# Patient Record
Sex: FEMALE | Race: WHITE | NOT HISPANIC OR LATINO | Employment: UNEMPLOYED | URBAN - METROPOLITAN AREA
[De-identification: names, ages, dates, MRNs, and addresses within clinical notes are randomized per-mention and may not be internally consistent; named-entity substitution may affect disease eponyms.]

---

## 2019-09-19 LAB
ABO GROUP BLD: NORMAL
BLD GP AB SCN SERPL QL: NEGATIVE
HBV SURFACE AG SERPL QL IA: NEGATIVE
RH BLD: NEGATIVE
RUBV IGG SERPL IA-ACNC: NORMAL

## 2019-12-13 ENCOUNTER — INITIAL PRENATAL (OUTPATIENT)
Dept: OBGYN | Facility: CLINIC | Age: 29
End: 2019-12-13
Payer: COMMERCIAL

## 2019-12-13 VITALS
HEIGHT: 63 IN | BODY MASS INDEX: 27.85 KG/M2 | SYSTOLIC BLOOD PRESSURE: 96 MMHG | DIASTOLIC BLOOD PRESSURE: 60 MMHG | WEIGHT: 157.2 LBS

## 2019-12-13 DIAGNOSIS — Z34.80 SUPERVISION OF OTHER NORMAL PREGNANCY, ANTEPARTUM: Primary | ICD-10-CM

## 2019-12-13 PROCEDURE — 59402 PR NEW OB HIGH RISK: CPT | Performed by: NURSE PRACTITIONER

## 2019-12-13 PROCEDURE — 81002 URINALYSIS NONAUTO W/O SCOPE: CPT | Performed by: NURSE PRACTITIONER

## 2019-12-13 ASSESSMENT — ENCOUNTER SYMPTOMS
PSYCHIATRIC NEGATIVE: 1
CARDIOVASCULAR NEGATIVE: 1
GASTROINTESTINAL NEGATIVE: 1
RESPIRATORY NEGATIVE: 1
NEUROLOGICAL NEGATIVE: 1
CONSTITUTIONAL NEGATIVE: 1
MUSCULOSKELETAL NEGATIVE: 1
EYES NEGATIVE: 1

## 2019-12-13 NOTE — PROGRESS NOTES
S:  Zoe Whitmore is a 29 y.o. No obstetric history on file. who presents for her new OB exam.  She is 20w4d with and BEL of Estimated Date of Delivery: 2020. based off of LMP which was consistent with early US. She has no complaints.  She is currently not working. Discussed heavy lifting and chemical exposure. No ER visits. She has had previous care in this pregnancy in [place]. Records brought with patient. PNL done, declines AFP, and complete anatomy US not done yet.    Declines AFP.  Declines CF.  Denies VB, LOF, or cramping.  Denies dysuria, vaginal DC. Reports good fetal movement.     Pt is  and lives with  and child.  Pregnancy is planned and desired.  She has a history of 1 full term  without any complications. She also had an early 9 week SAB which she states passed on its own without any complications.      Discussed diet and exercise during pregnancy. Encouraged good nutrition, and daily exercise including walking or swimming. Discussed expected weight gain during pregnancy. Discussed adequate hydration during pregnancy.    No past medical history on file.  No family history on file.  Social History     Socioeconomic History   • Marital status: Single     Spouse name: Not on file   • Number of children: Not on file   • Years of education: Not on file   • Highest education level: Not on file   Occupational History   • Not on file   Social Needs   • Financial resource strain: Not on file   • Food insecurity:     Worry: Not on file     Inability: Not on file   • Transportation needs:     Medical: Not on file     Non-medical: Not on file   Tobacco Use   • Smoking status: Not on file   Substance and Sexual Activity   • Alcohol use: Not on file   • Drug use: Not on file   • Sexual activity: Not on file   Lifestyle   • Physical activity:     Days per week: Not on file     Minutes per session: Not on file   • Stress: Not on file   Relationships   • Social connections:     Talks on phone:  "Not on file     Gets together: Not on file     Attends [employer] service: Not on file     Active member of club or organization: Not on file     Attends meetings of clubs or organizations: Not on file     Relationship status: Not on file   • Intimate partner violence:     Fear of current or ex partner: Not on file     Emotionally abused: Not on file     Physically abused: Not on file     Forced sexual activity: Not on file   Other Topics Concern   • Not on file   Social History Narrative   • Not on file     OB History    Para Term  AB Living   1             SAB TAB Ectopic Molar Multiple Live Births                    # Outcome Date GA Lbr Ambrocio/2nd Weight Sex Delivery Anes PTL Lv   1 Current                History of Varicella Virus: yes  History of HSV I or II in self or partner: no  History of Thyroid problems: no    O:  BP (!) 96/60   Ht 1.6 m (5' 3\")   Wt 71.3 kg (157 lb 3.2 oz)    See Prenatal Physical.    Wet mount: deferred, no s/sx  Chaperone offered: n/a    A:   1.  IUP @ 20w4d per LMP        2.  S=D        3.  See problem list below           There are no active problems to display for this patient.        P:  1.  GC/CT & pap done at previous clinic- records requested for Pap        2.  Prenatal labs already done, see media        3.  Discussed PNV, diet, avoidances and adequate water intake        4.  NOB packet given        5.  Return to office in 4 wks        6.  Complete OB US at next available            No orders of the defined types were placed in this encounter.      HPI    Review of Systems   Constitutional: Negative.    HENT: Negative.    Eyes: Negative.    Respiratory: Negative.    Cardiovascular: Negative.    Gastrointestinal: Negative.    Genitourinary: Negative.    Musculoskeletal: Negative.    Skin: Negative.    Neurological: Negative.    Endo/Heme/Allergies: Negative.    Psychiatric/Behavioral: Negative.    All other systems reviewed and are negative.         Objective: " "    BP (!) 96/60   Ht 1.6 m (5' 3\")   Wt 71.3 kg (157 lb 3.2 oz)   LMP 07/20/2019   BMI 27.85 kg/m²      Physical Exam  Vitals signs and nursing note reviewed.   Constitutional:       Appearance: Normal appearance. She is normal weight.   HENT:      Head: Normocephalic and atraumatic.      Nose: Nose normal.   Eyes:      Extraocular Movements: Extraocular movements intact.   Neck:      Musculoskeletal: Normal range of motion and neck supple.   Cardiovascular:      Rate and Rhythm: Normal rate and regular rhythm.      Pulses: Normal pulses.      Heart sounds: Normal heart sounds.   Pulmonary:      Effort: Pulmonary effort is normal.      Breath sounds: Normal breath sounds.   Abdominal:      Palpations: Abdomen is soft.   Musculoskeletal: Normal range of motion.   Skin:     General: Skin is warm and dry.   Neurological:      General: No focal deficit present.      Mental Status: She is alert and oriented to person, place, and time.   Psychiatric:         Mood and Affect: Mood normal.         Behavior: Behavior normal.         Thought Content: Thought content normal.         Judgment: Judgment normal.          Assessment/Plan:     1. Supervision of other normal pregnancy, antepartum      "

## 2019-12-19 ENCOUNTER — APPOINTMENT (OUTPATIENT)
Dept: RADIOLOGY | Facility: IMAGING CENTER | Age: 29
End: 2019-12-19
Attending: NURSE PRACTITIONER
Payer: COMMERCIAL

## 2019-12-19 DIAGNOSIS — Z34.80 SUPERVISION OF OTHER NORMAL PREGNANCY, ANTEPARTUM: ICD-10-CM

## 2019-12-19 LAB
APPEARANCE UR: NORMAL
BILIRUB UR STRIP-MCNC: NORMAL MG/DL
COLOR UR AUTO: YELLOW
GLUCOSE UR STRIP.AUTO-MCNC: NEGATIVE MG/DL
KETONES UR STRIP.AUTO-MCNC: NEGATIVE MG/DL
LEUKOCYTE ESTERASE UR QL STRIP.AUTO: NEGATIVE
NITRITE UR QL STRIP.AUTO: POSITIVE
PH UR STRIP.AUTO: 7 [PH] (ref 5–8)
PROT UR QL STRIP: NEGATIVE MG/DL
RBC UR QL AUTO: NEGATIVE
SP GR UR STRIP.AUTO: 1.02
UROBILINOGEN UR STRIP-MCNC: NORMAL MG/DL

## 2019-12-19 PROCEDURE — 76805 OB US >/= 14 WKS SNGL FETUS: CPT | Performed by: OBSTETRICS & GYNECOLOGY

## 2019-12-19 NOTE — ADDENDUM NOTE
Addended by: MACKENZIE ALEXANDRA on: 12/19/2019 10:17 AM     Modules accepted: Marcelo, SmartSet

## 2019-12-19 NOTE — PROGRESS NOTES
Pt. Here for NOB visit.  # [phone]  Pt states had U/S 10/19/19 was told she was 8-9 weeks pregnant.   Pt. States no complaints.   Pharmacy verified.   Chaperone offered and not needed.   Please have patient sign refusal form for AFP on next visit.  Pt documented on this date due to downtime.

## 2020-01-09 ENCOUNTER — ROUTINE PRENATAL (OUTPATIENT)
Dept: OBGYN | Facility: CLINIC | Age: 30
End: 2020-01-09
Payer: COMMERCIAL

## 2020-01-09 VITALS — DIASTOLIC BLOOD PRESSURE: 60 MMHG | BODY MASS INDEX: 28.52 KG/M2 | SYSTOLIC BLOOD PRESSURE: 100 MMHG | WEIGHT: 161 LBS

## 2020-01-09 DIAGNOSIS — Z34.02 SUPERVISION OF NORMAL FIRST PREGNANCY IN SECOND TRIMESTER: Primary | ICD-10-CM

## 2020-01-09 PROCEDURE — 90040 PR PRENATAL FOLLOW UP: CPT | Performed by: NURSE PRACTITIONER

## 2020-01-09 NOTE — PROGRESS NOTES
S) Pt is a 29 y.o.   at 24w5d  gestation. Routine prenatal care today. Pt without concerns today.    Fetal movement Normal  Cramping no  VB no  LOF no   Denies dysuria. Generally feels well today. Good self-care activities identified. Denies headaches, swelling, visual changes, or epigastric pain .     O) /60   Wt 73 kg (161 lb)         Labs:       PNL: WNL       GCT:       AFP: Not Examined       GBS: N/A       Pertinent ultrasound - 19 PARISH 12.73, survey WNL, c/w prev dating           A) IUP at 24w5d       S=D         Patient Active Problem List    Diagnosis Date Noted   • Supervision of other normal pregnancy, antepartum 2019          SVE: deferred         TDAP: no       FLU: no        BTL: no       : no       C/S Consent: no       IOL or C/S scheduled: no       LAST PAP: none on file         P) s/s ptl vs general discomforts. Fetal movements reviewed. General ed and anticipatory guidance. Nutrition/exercise/vitamin. Plans breast Plans pp contraception- unsure  Continue PNV.   Given 3rd trimester labs with instructions.  RTC 4 weeks or PRN.

## 2020-01-09 NOTE — PROGRESS NOTES
OB follow up   + fetal movement.  No VB, LOF or UC's.  Wt: 161lb      BP:100/60  Phone # [phone]  Preferred pharmacy confirmed.  3rd trimester lab orders pended and instructions given to patient

## 2020-01-10 ENCOUNTER — HOSPITAL ENCOUNTER (OUTPATIENT)
Dept: LAB | Facility: MEDICAL CENTER | Age: 30
End: 2020-01-10
Attending: NURSE PRACTITIONER
Payer: COMMERCIAL

## 2020-01-10 DIAGNOSIS — Z34.02 SUPERVISION OF NORMAL FIRST PREGNANCY IN SECOND TRIMESTER: ICD-10-CM

## 2020-01-10 LAB
GLUCOSE 1H P 50 G GLC PO SERPL-MCNC: 76 MG/DL (ref 70–139)
HCT VFR BLD AUTO: 36.5 % (ref 37–47)
HGB BLD-MCNC: 11.8 G/DL (ref 12–16)
TREPONEMA PALLIDUM IGG+IGM AB [PRESENCE] IN SERUM OR PLASMA BY IMMUNOASSAY: NON REACTIVE

## 2020-01-10 PROCEDURE — 36415 COLL VENOUS BLD VENIPUNCTURE: CPT

## 2020-01-10 PROCEDURE — 82950 GLUCOSE TEST: CPT

## 2020-01-10 PROCEDURE — 85018 HEMOGLOBIN: CPT

## 2020-01-10 PROCEDURE — 86780 TREPONEMA PALLIDUM: CPT

## 2020-01-10 PROCEDURE — 85014 HEMATOCRIT: CPT

## 2020-02-05 ENCOUNTER — ROUTINE PRENATAL (OUTPATIENT)
Dept: OBGYN | Facility: CLINIC | Age: 30
End: 2020-02-05
Payer: COMMERCIAL

## 2020-02-05 VITALS — SYSTOLIC BLOOD PRESSURE: 114 MMHG | WEIGHT: 168 LBS | DIASTOLIC BLOOD PRESSURE: 64 MMHG | BODY MASS INDEX: 29.76 KG/M2

## 2020-02-05 DIAGNOSIS — O26.899 RH NEGATIVE STATE IN ANTEPARTUM PERIOD: ICD-10-CM

## 2020-02-05 DIAGNOSIS — Z34.80 SUPERVISION OF OTHER NORMAL PREGNANCY, ANTEPARTUM: Primary | ICD-10-CM

## 2020-02-05 DIAGNOSIS — Z67.91 RH NEGATIVE STATE IN ANTEPARTUM PERIOD: ICD-10-CM

## 2020-02-05 PROCEDURE — 90040 PR PRENATAL FOLLOW UP: CPT | Performed by: NURSE PRACTITIONER

## 2020-02-05 PROCEDURE — 90715 TDAP VACCINE 7 YRS/> IM: CPT | Performed by: NURSE PRACTITIONER

## 2020-02-05 PROCEDURE — 90471 IMMUNIZATION ADMIN: CPT | Performed by: NURSE PRACTITIONER

## 2020-02-05 NOTE — LETTER
"Count Your Baby's Movements  Another step to a healthy delivery    Kateywalt Haim             Dept: [phone]    How Many Weeks Pregnant? 28w4d    Date to Begin Countin20              How to use this chart    One way for your physician to keep track of your baby's health is by knowing how often the baby moves (or \"kicks\") in your womb.  You can help your physician to do this by using this chart every day.    Every day, you should see how many hours it takes for your baby to move 10 times.  Start in the morning, as soon as you get up.    · First, write down the time your baby moves until you get to 10.  · Check off one box every time your baby moves until you get to 10.  · Write down the time you finished counting in the last column.  · Total how long it took to count up all 10 movements.  · Finally, fill in the box that shows how long this took.  After counting 10 movements, you no longer have to count any more that day.  The next morning, just start counting again as soon as you get up.    What should you call a \"movement\"?  It is hard to say, because it will feel different from one mother to another and from one pregnancy to the next.  The important thing is that you count the movements the same way throughout your pregnancy.  If you have more questions, you should ask your physician.    Count carefully every day!  SAMPLE:  Week 28    How many hours did it take to feel 10 movements?       Start  Time     1     2     3     4     5     6     7     8     9     10   Finish Time   Mon 8:20 ·  ·  ·  ·  ·  ·  ·  ·  ·  ·  11:40                  Sat               Sun                 IMPORTANT: You should contact your physician if it takes more than two hours for you to feel 10 movements.  Each morning, write down the time and start to count the movements of your baby.  Keep track by checking off one box every time you feel one movement.  When you have felt " "10 \"kicks\", write down the time you finished counting in the last column.  Then fill in the   box (over the check jamison) for the number of hours it took.  Be sure to read the complete instructions on the previous page.            "

## 2020-02-05 NOTE — PROGRESS NOTES
S) Pt is a 29 y.o.   at 28w4d  gestation. Routine prenatal care today. No complaints today. Rhogam today. Desires BTL. Tdap today.  labor precautions reviewed, all questions answered.   Fetal movement Normal  Cramping no  VB no  LOF no   Denies dysuria. Generally feels well today. Good self-care activities identified. Denies headaches, swelling, visual changes, or epigastric pain .     O) /64   Wt 76.2 kg (168 lb)         Labs:       PNL: WNL       GCT: 76        AFP: Not Examined       GBS: N/A       Pertinent ultrasound -        19- Survey WNL, PARISH 12.73cm, c/w prev dating.    A) IUP at 28w4d       S=D         Patient Active Problem List    Diagnosis Date Noted   • Rh negative state in antepartum period 2020   • Supervision of other normal pregnancy, antepartum 2019          SVE: deferred       Chaperone offered: n/a         TDAP: yes       FLU: no        BTL: yes       : n/a       C/S Consent: n/a       IOL or C/S scheduled: no       LAST PAP: None on file, will do at PP visit         P) s/s ptl vs general discomforts. Fetal movements reviewed. General ed and anticipatory guidance. Nutrition/exercise/vitamin. Plans breast Plans pp contraception- unsure, possible BTL.  Continue PNV.

## 2020-02-05 NOTE — LETTER
"Count Your Baby's Movements  Another step to a healthy delivery    Kateywalt Haim             Dept: [phone]    How Many Weeks Pregnant? 28w4d    Date to Begin Countin20              How to use this chart    One way for your physician to keep track of your baby's health is by knowing how often the baby moves (or \"kicks\") in your womb.  You can help your physician to do this by using this chart every day.    Every day, you should see how many hours it takes for your baby to move 10 times.  Start in the morning, as soon as you get up.    · First, write down the time your baby moves until you get to 10.  · Check off one box every time your baby moves until you get to 10.  · Write down the time you finished counting in the last column.  · Total how long it took to count up all 10 movements.  · Finally, fill in the box that shows how long this took.  After counting 10 movements, you no longer have to count any more that day.  The next morning, just start counting again as soon as you get up.    What should you call a \"movement\"?  It is hard to say, because it will feel different from one mother to another and from one pregnancy to the next.  The important thing is that you count the movements the same way throughout your pregnancy.  If you have more questions, you should ask your physician.    Count carefully every day!  SAMPLE:  Week 28    How many hours did it take to feel 10 movements?       Start  Time     1     2     3     4     5     6     7     8     9     10   Finish Time   Mon 8:20 ·  ·  ·  ·  ·  ·  ·  ·  ·  ·  11:40                  Sat               Sun                 IMPORTANT: You should contact your physician if it takes more than two hours for you to feel 10 movements.  Each morning, write down the time and start to count the movements of your baby.  Keep track by checking off one box every time you feel one movement.  When you have felt " "10 \"kicks\", write down the time you finished counting in the last column.  Then fill in the   box (over the check jamison) for the number of hours it took.  Be sure to read the complete instructions on the previous page.            "

## 2020-02-05 NOTE — PROGRESS NOTES
Pt here today for OB follow up  Pt states no complaints  Reports +FM  Good # [phone]  Pharmacy Confirmed.  Chaperone offered and provided.   RAMAKRISHNA sheet instructions given  Pt desires Tdap  Pt desires BTL  Tdap vaccine given. Right Deltoid. VIS given and screening check list reviewed with pt. Vaccine verified by Malka Webb today  NDC: 52690-697-70  LOT#: [id number]  Expiration Date: 2022  Dose: 1500IU  Site: Right Upper Outer Quadrant Gluteal  Patient educated on use and side effects of medication. Name and  verified prior to injection. Pt tolerated? Yes   Verified by Malka   Administered by Marvel Cintron Ass't at 8:42 AM.  Patient Provided Medication: no

## 2020-02-19 ENCOUNTER — ROUTINE PRENATAL (OUTPATIENT)
Dept: OBGYN | Facility: CLINIC | Age: 30
End: 2020-02-19
Payer: COMMERCIAL

## 2020-02-19 VITALS — BODY MASS INDEX: 30.11 KG/M2 | SYSTOLIC BLOOD PRESSURE: 100 MMHG | WEIGHT: 170 LBS | DIASTOLIC BLOOD PRESSURE: 58 MMHG

## 2020-02-19 DIAGNOSIS — Z34.83 ENCOUNTER FOR SUPERVISION OF OTHER NORMAL PREGNANCY, THIRD TRIMESTER: ICD-10-CM

## 2020-02-19 PROCEDURE — 90040 PR PRENATAL FOLLOW UP: CPT | Performed by: ADVANCED PRACTICE MIDWIFE

## 2020-02-19 NOTE — NON-PROVIDER
S)  Zoe is a 28 y/o  at 30w4d gestation. Presents today for routine prenatal care. Has not been seen in the emergency department since her last visit. Reports no issues at this time. Reports fetal movement. Denies any cramping/contractions, bleeding, or leaking of fluid. Denies headache or N/V. Generally feels well today.    O)  Vitals WNL  See prenatal flowsheet    A)  IUP 30w4d  S=D    P)  -S/S pre-term labor v general discomforts reviewed.   -Continue Fetal Kick Counts  -Adequate hydration reinforced  -Nutrition/exercise/vitamin education; continue PNV  -Anticipatory guidance given  -RTC in 2 weeks for routine prenatal care.     TATI Hahn, SNP

## 2020-02-19 NOTE — PROGRESS NOTES
Pt. Here for OB/FU. Reports Good FM.   Good # [phone]  Pt. States last week had a really bad sharp pain on her lower left pelvic area.   Pharmacy verified.

## 2020-03-04 ENCOUNTER — ROUTINE PRENATAL (OUTPATIENT)
Dept: OBGYN | Facility: CLINIC | Age: 30
End: 2020-03-04
Payer: COMMERCIAL

## 2020-03-04 VITALS — BODY MASS INDEX: 30.11 KG/M2 | WEIGHT: 170 LBS | DIASTOLIC BLOOD PRESSURE: 54 MMHG | SYSTOLIC BLOOD PRESSURE: 90 MMHG

## 2020-03-04 DIAGNOSIS — Z34.83 ENCOUNTER FOR SUPERVISION OF OTHER NORMAL PREGNANCY, THIRD TRIMESTER: ICD-10-CM

## 2020-03-04 PROCEDURE — 90040 PR PRENATAL FOLLOW UP: CPT | Performed by: ADVANCED PRACTICE MIDWIFE

## 2020-03-04 NOTE — PROGRESS NOTES
Pt here today for OB follow up  Pt states she is feeling a lot of pressure, and having juani syed   Reports +  Good # [phone]  Pharmacy Confirmed.  Chaperone offered and not indicated.

## 2020-03-04 NOTE — PROGRESS NOTES
SUBJECTIVE:  Pt is a 29 y.o.   at 32w4d  gestation. Presents today for follow-up prenatal care. Has not been seen in ER or L & D since last visit. Reports good  fetal movement. Denies leaking of fluid dysuria, headaches, or N/V at this time.  Patient does not report cramping/contractions. Generally feels well today.     OBJECTIVE:   BP (!) 90/54   Wt 77.1 kg (170 lb)   LMP 2019   BMI 30.11 kg/m²   Patients' weight gain, fluid intake and exercise level discussed.  Vitals, fundal height , fetal position, and FHR reviewed on flowsheet    Lab:No results found for this or any previous visit (from the past 336 hour(s)).    ASSESSMENT/ PLAN:   - IUP at 32w4d    - S = D   -   Patient Active Problem List    Diagnosis Date Noted   • Rh negative state in antepartum period 2020   • Supervision of other normal pregnancy, antepartum 2019           - S/sx pregnancy and labor warning signs vs general discomforts discussed  - Fetal movements and kick counts reviewed. Adequate hydration reinforced  - Anticipatory guidance provided.   - RTC in 2 weeks for routine prenatal care.

## 2020-03-18 ENCOUNTER — ROUTINE PRENATAL (OUTPATIENT)
Dept: OBGYN | Facility: CLINIC | Age: 30
End: 2020-03-18
Payer: COMMERCIAL

## 2020-03-18 VITALS — BODY MASS INDEX: 30.11 KG/M2 | WEIGHT: 170 LBS | SYSTOLIC BLOOD PRESSURE: 98 MMHG | DIASTOLIC BLOOD PRESSURE: 58 MMHG

## 2020-03-18 DIAGNOSIS — Z34.80 SUPERVISION OF OTHER NORMAL PREGNANCY, ANTEPARTUM: ICD-10-CM

## 2020-03-18 PROCEDURE — 90040 PR PRENATAL FOLLOW UP: CPT | Performed by: PHYSICIAN ASSISTANT

## 2020-03-18 NOTE — PROGRESS NOTES
Pt has no complaints with cramping, UCs, Vb, LOF. +FM. Pt hasnt gained weight over past few visits, but states she is eating well and baby is growing appropriately. GBS next visit. PTL precautions reviewed. Pt needs rx for breast pump after delivery. Daily FKC and walks recommended. RTC 2 wk or sooner prn.

## 2020-03-18 NOTE — PROGRESS NOTES
Pt. Here for OB/FU. Reports Good FM.   Good # [phone]  Pt states no complaints.   Pharmacy verified.

## 2020-04-01 ENCOUNTER — ROUTINE PRENATAL (OUTPATIENT)
Dept: OBGYN | Facility: CLINIC | Age: 30
End: 2020-04-01
Payer: COMMERCIAL

## 2020-04-01 ENCOUNTER — HOSPITAL ENCOUNTER (OUTPATIENT)
Facility: MEDICAL CENTER | Age: 30
End: 2020-04-01
Attending: NURSE PRACTITIONER
Payer: COMMERCIAL

## 2020-04-01 VITALS — WEIGHT: 173 LBS | SYSTOLIC BLOOD PRESSURE: 90 MMHG | BODY MASS INDEX: 30.65 KG/M2 | DIASTOLIC BLOOD PRESSURE: 58 MMHG

## 2020-04-01 DIAGNOSIS — Z34.90 ENCOUNTER FOR SUPERVISION OF NORMAL PREGNANCY, ANTEPARTUM: ICD-10-CM

## 2020-04-01 DIAGNOSIS — Z34.90 ENCOUNTER FOR SUPERVISION OF NORMAL PREGNANCY, ANTEPARTUM: Primary | ICD-10-CM

## 2020-04-01 PROCEDURE — 90040 PR PRENATAL FOLLOW UP: CPT | Performed by: NURSE PRACTITIONER

## 2020-04-01 PROCEDURE — 87081 CULTURE SCREEN ONLY: CPT

## 2020-04-01 PROCEDURE — 87150 DNA/RNA AMPLIFIED PROBE: CPT

## 2020-04-01 ASSESSMENT — PATIENT HEALTH QUESTIONNAIRE - PHQ9: CLINICAL INTERPRETATION OF PHQ2 SCORE: 0

## 2020-04-01 NOTE — PROGRESS NOTES
Pt here today for OB follow up  Pt states no complaints   Reports +FM   Good # [phone]  Pharmacy Confirmed.   Chaperone offered and not indicated.   GBS today

## 2020-04-01 NOTE — PROGRESS NOTES
S:  Pt is  at 36w4d here for routine OB follow up.  No concerns today. Discussed pt desire for 2 week visit versus 1 week visit. This pregnancy has been uneventful, all labs normal. Reports good FM.  Denies VB, LOF, RUCs, or vaginal DC.     O:  Please see above vitals.        FHTs: 140        Fundal ht: 34 cm        Fetal position: vertex        S=D             A:  IUP at 36w4d  Patient Active Problem List    Diagnosis Date Noted   • Rh negative state in antepartum period - Rhogam given 2020   • Supervision of other normal pregnancy, antepartum 2019       P:  1.  Continue FKCs.         2.  Labor precautions given.  Instructions given on where to go.  Pt receptive to education.         3.  Questions answered.         4.  Encouraged adequate water intake, walking 30-60 minutes daily, bounce ball, pelvic rocking       5.  GBS today       6.  F/u 1wk       7.  Pt to return in 2 week or prn. Discussed daily movements, labor precautions.

## 2020-04-02 LAB — GP B STREP DNA SPEC QL NAA+PROBE: NEGATIVE

## 2020-04-15 ENCOUNTER — ROUTINE PRENATAL (OUTPATIENT)
Dept: OBGYN | Facility: CLINIC | Age: 30
End: 2020-04-15
Payer: COMMERCIAL

## 2020-04-15 VITALS — BODY MASS INDEX: 31.18 KG/M2 | WEIGHT: 176 LBS | DIASTOLIC BLOOD PRESSURE: 56 MMHG | SYSTOLIC BLOOD PRESSURE: 100 MMHG

## 2020-04-15 DIAGNOSIS — Z34.83 ENCOUNTER FOR SUPERVISION OF OTHER NORMAL PREGNANCY, THIRD TRIMESTER: ICD-10-CM

## 2020-04-15 PROCEDURE — 90040 PR PRENATAL FOLLOW UP: CPT | Performed by: ADVANCED PRACTICE MIDWIFE

## 2020-04-15 NOTE — PROGRESS NOTES
Pt. Here for OB/FU. Reports Good FM.   Good # [phone]  Pt. States having some strong contractions and states having some back pain  Pharmacy verified.

## 2020-04-15 NOTE — PROGRESS NOTES
"  SUBJECTIVE:  Pt is a 29 y.o.   at 38w4d  gestation. Presents today for follow-up prenatal care. Has not been seen in ER or L & D since last visit. Reports good  fetal movement. Denies leaking of fluid dysuria, headaches, or N/V at this time.  Patient does report cramping/contractions. Generally feels well today. She reports that cramping is \"all over\". Would like to be checked today. Interested in IOL after due date around 41 weeks.     OBJECTIVE:   /56   Wt 79.8 kg (176 lb)   LMP 2019   BMI 31.18 kg/m²   Patients' weight gain, fluid intake and exercise level discussed.  Vitals, fundal height , fetal position, and FHR reviewed on flowsheet    Lab:No results found for this or any previous visit (from the past 336 hour(s)).    ASSESSMENT/ PLAN:   - IUP at 38w4d    - S < D   -   Patient Active Problem List    Diagnosis Date Noted   • Rh negative state in antepartum period - Rhogam given 2020   • Supervision of other normal pregnancy, antepartum 2019       Discussed IOL and request was sent today.     - S/sx pregnancy and labor warning signs vs general discomforts discussed  - Fetal movements and kick counts reviewed. Adequate hydration reinforced  - Anticipatory guidance provided. GBS negative  - RTC in 1 weeks for routine prenatal care.    "

## 2020-04-24 ENCOUNTER — ROUTINE PRENATAL (OUTPATIENT)
Dept: OBGYN | Facility: CLINIC | Age: 30
End: 2020-04-24
Payer: COMMERCIAL

## 2020-04-24 VITALS — BODY MASS INDEX: 31.67 KG/M2 | WEIGHT: 178.8 LBS | SYSTOLIC BLOOD PRESSURE: 98 MMHG | DIASTOLIC BLOOD PRESSURE: 62 MMHG

## 2020-04-24 DIAGNOSIS — Z34.83 ENCOUNTER FOR SUPERVISION OF OTHER NORMAL PREGNANCY IN THIRD TRIMESTER: Primary | ICD-10-CM

## 2020-04-24 PROCEDURE — 90040 PR PRENATAL FOLLOW UP: CPT | Performed by: NURSE PRACTITIONER

## 2020-04-24 NOTE — PROGRESS NOTES
S:  Pt is  at 39w6d here for routine OB follow up.  No concerns today, states her apartment complex is under construction and as such is not able to walk much. She is utilizing a birthing ball.  Reports good FM.  Denies VB, LOF, RUCs, or vaginal DC.     O:  Please see above vitals.        FHTs: 145        Fundal ht: 38 cm        Fetal position: vertex        SVE: deferred        GBS negative  -- reviewed w pt.      A:  IUP at 39w6d  Patient Active Problem List    Diagnosis Date Noted   • Rh negative state in antepartum period - Rhogam given 2020   • Supervision of other normal pregnancy, antepartum 2019       P:  1.  Anticipatory guidance given         2.  Labor precautions given.  Instructions given on where to go.  Pt receptive to education.         3.  D/w pt IOL policy.  IOL scheduled.        4.  Questions answered.         5.  Encouraged adequate water intake, walking 30-60 min daily, pelvic rocking, birthing ball       6.  F/u 1wk for IOL. Visiting restrictions discussed

## 2020-04-24 NOTE — PROGRESS NOTES
OB follow up   + fetal movement. Active  No VB, LOF or UC's.  Wt: 178.8LBS      BP:98/62  Phone # [phone]  Preferred pharmacy confirmed.  No complaints as of today   Up to date on everything  IOL instructions given today

## 2020-04-30 ENCOUNTER — ANESTHESIA EVENT (OUTPATIENT)
Dept: ANESTHESIOLOGY | Facility: MEDICAL CENTER | Age: 30
End: 2020-04-30
Payer: COMMERCIAL

## 2020-04-30 ENCOUNTER — ANESTHESIA (OUTPATIENT)
Dept: ANESTHESIOLOGY | Facility: MEDICAL CENTER | Age: 30
End: 2020-04-30
Payer: COMMERCIAL

## 2020-04-30 ENCOUNTER — HOSPITAL ENCOUNTER (INPATIENT)
Facility: MEDICAL CENTER | Age: 30
LOS: 1 days | End: 2020-05-01
Attending: OBSTETRICS & GYNECOLOGY | Admitting: OBSTETRICS & GYNECOLOGY
Payer: COMMERCIAL

## 2020-04-30 LAB
ACTION RH IMMUNE GLOB 8505RHG: NORMAL
BASOPHILS # BLD AUTO: 0.3 % (ref 0–1.8)
BASOPHILS # BLD: 0.03 K/UL (ref 0–0.12)
EOSINOPHIL # BLD AUTO: 0.07 K/UL (ref 0–0.51)
EOSINOPHIL NFR BLD: 0.6 % (ref 0–6.9)
ERYTHROCYTE [DISTWIDTH] IN BLOOD BY AUTOMATED COUNT: 41.7 FL (ref 35.9–50)
ERYTHROCYTE [DISTWIDTH] IN BLOOD BY AUTOMATED COUNT: 45 FL (ref 35.9–50)
HCT VFR BLD AUTO: 31.8 % (ref 37–47)
HCT VFR BLD AUTO: 34.9 % (ref 37–47)
HGB BLD-MCNC: 10.1 G/DL (ref 12–16)
HGB BLD-MCNC: 11.7 G/DL (ref 12–16)
HOLDING TUBE BB 8507: NORMAL
IMM GRANULOCYTES # BLD AUTO: 0.08 K/UL (ref 0–0.11)
IMM GRANULOCYTES NFR BLD AUTO: 0.7 % (ref 0–0.9)
IMMUNE ROSETTING TEST 8505FMH: NORMAL
LYMPHOCYTES # BLD AUTO: 1.99 K/UL (ref 1–4.8)
LYMPHOCYTES NFR BLD: 17.9 % (ref 22–41)
MCH RBC QN AUTO: 28.4 PG (ref 27–33)
MCH RBC QN AUTO: 28.5 PG (ref 27–33)
MCHC RBC AUTO-ENTMCNC: 31.8 G/DL (ref 33.6–35)
MCHC RBC AUTO-ENTMCNC: 33.5 G/DL (ref 33.6–35)
MCV RBC AUTO: 84.7 FL (ref 81.4–97.8)
MCV RBC AUTO: 89.8 FL (ref 81.4–97.8)
MONOCYTES # BLD AUTO: 0.69 K/UL (ref 0–0.85)
MONOCYTES NFR BLD AUTO: 6.2 % (ref 0–13.4)
NEUTROPHILS # BLD AUTO: 8.25 K/UL (ref 2–7.15)
NEUTROPHILS NFR BLD: 74.3 % (ref 44–72)
NRBC # BLD AUTO: 0 K/UL
NRBC BLD-RTO: 0 /100 WBC
NUMBER OF RH DOSES IND 8505RD: 1
PLATELET # BLD AUTO: 216 K/UL (ref 164–446)
PLATELET # BLD AUTO: 280 K/UL (ref 164–446)
PMV BLD AUTO: 9.9 FL (ref 9–12.9)
PMV BLD AUTO: 9.9 FL (ref 9–12.9)
RBC # BLD AUTO: 3.54 M/UL (ref 4.2–5.4)
RBC # BLD AUTO: 4.12 M/UL (ref 4.2–5.4)
RH BLD: NORMAL
WBC # BLD AUTO: 11.1 K/UL (ref 4.8–10.8)
WBC # BLD AUTO: 11.5 K/UL (ref 4.8–10.8)

## 2020-04-30 PROCEDURE — 0KQM0ZZ REPAIR PERINEUM MUSCLE, OPEN APPROACH: ICD-10-PCS | Performed by: OBSTETRICS & GYNECOLOGY

## 2020-04-30 PROCEDURE — 85461 HEMOGLOBIN FETAL: CPT

## 2020-04-30 PROCEDURE — 304965 HCHG RECOVERY SERVICES

## 2020-04-30 PROCEDURE — 700105 HCHG RX REV CODE 258: Performed by: ANESTHESIOLOGY

## 2020-04-30 PROCEDURE — 700105 HCHG RX REV CODE 258: Performed by: OBSTETRICS & GYNECOLOGY

## 2020-04-30 PROCEDURE — 85025 COMPLETE CBC W/AUTO DIFF WBC: CPT

## 2020-04-30 PROCEDURE — 700101 HCHG RX REV CODE 250

## 2020-04-30 PROCEDURE — 86901 BLOOD TYPING SEROLOGIC RH(D): CPT

## 2020-04-30 PROCEDURE — 85027 COMPLETE CBC AUTOMATED: CPT

## 2020-04-30 PROCEDURE — 36415 COLL VENOUS BLD VENIPUNCTURE: CPT

## 2020-04-30 PROCEDURE — A9270 NON-COVERED ITEM OR SERVICE: HCPCS | Performed by: OBSTETRICS & GYNECOLOGY

## 2020-04-30 PROCEDURE — 59400 OBSTETRICAL CARE: CPT | Performed by: OBSTETRICS & GYNECOLOGY

## 2020-04-30 PROCEDURE — 700102 HCHG RX REV CODE 250 W/ 637 OVERRIDE(OP): Performed by: OBSTETRICS & GYNECOLOGY

## 2020-04-30 PROCEDURE — 700112 HCHG RX REV CODE 229: Performed by: OBSTETRICS & GYNECOLOGY

## 2020-04-30 PROCEDURE — 59409 OBSTETRICAL CARE: CPT

## 2020-04-30 PROCEDURE — 700111 HCHG RX REV CODE 636 W/ 250 OVERRIDE (IP)

## 2020-04-30 PROCEDURE — 770002 HCHG ROOM/CARE - OB PRIVATE (112)

## 2020-04-30 PROCEDURE — 700111 HCHG RX REV CODE 636 W/ 250 OVERRIDE (IP): Performed by: OBSTETRICS & GYNECOLOGY

## 2020-04-30 PROCEDURE — 303615 HCHG EPIDURAL/SPINAL ANESTHESIA FOR LABOR

## 2020-04-30 PROCEDURE — 700101 HCHG RX REV CODE 250: Performed by: ANESTHESIOLOGY

## 2020-04-30 PROCEDURE — 3E0334Z INTRODUCTION OF SERUM, TOXOID AND VACCINE INTO PERIPHERAL VEIN, PERCUTANEOUS APPROACH: ICD-10-PCS | Performed by: OBSTETRICS & GYNECOLOGY

## 2020-04-30 RX ORDER — ROPIVACAINE HYDROCHLORIDE 2 MG/ML
INJECTION, SOLUTION EPIDURAL; INFILTRATION; PERINEURAL
Status: COMPLETED
Start: 2020-04-30 | End: 2020-04-30

## 2020-04-30 RX ORDER — ALUMINA, MAGNESIA, AND SIMETHICONE 2400; 2400; 240 MG/30ML; MG/30ML; MG/30ML
30 SUSPENSION ORAL EVERY 6 HOURS PRN
Status: DISCONTINUED | OUTPATIENT
Start: 2020-04-30 | End: 2020-04-30 | Stop reason: HOSPADM

## 2020-04-30 RX ORDER — MISOPROSTOL 200 UG/1
600 TABLET ORAL
Status: DISCONTINUED | OUTPATIENT
Start: 2020-04-30 | End: 2020-05-01 | Stop reason: HOSPADM

## 2020-04-30 RX ORDER — ONDANSETRON 2 MG/ML
4 INJECTION INTRAMUSCULAR; INTRAVENOUS EVERY 6 HOURS PRN
Status: DISCONTINUED | OUTPATIENT
Start: 2020-04-30 | End: 2020-05-01 | Stop reason: HOSPADM

## 2020-04-30 RX ORDER — SODIUM CHLORIDE, SODIUM LACTATE, POTASSIUM CHLORIDE, AND CALCIUM CHLORIDE .6; .31; .03; .02 G/100ML; G/100ML; G/100ML; G/100ML
250 INJECTION, SOLUTION INTRAVENOUS PRN
Status: DISCONTINUED | OUTPATIENT
Start: 2020-04-30 | End: 2020-04-30 | Stop reason: HOSPADM

## 2020-04-30 RX ORDER — DOCUSATE SODIUM 100 MG/1
100 CAPSULE, LIQUID FILLED ORAL 2 TIMES DAILY PRN
Status: DISCONTINUED | OUTPATIENT
Start: 2020-04-30 | End: 2020-05-01 | Stop reason: HOSPADM

## 2020-04-30 RX ORDER — ROPIVACAINE HYDROCHLORIDE 2 MG/ML
INJECTION, SOLUTION EPIDURAL; INFILTRATION; PERINEURAL CONTINUOUS
Status: DISCONTINUED | OUTPATIENT
Start: 2020-04-30 | End: 2020-05-01 | Stop reason: HOSPADM

## 2020-04-30 RX ORDER — LIDOCAINE HYDROCHLORIDE AND EPINEPHRINE 15; 5 MG/ML; UG/ML
INJECTION, SOLUTION EPIDURAL
Status: COMPLETED | OUTPATIENT
Start: 2020-04-30 | End: 2020-04-30

## 2020-04-30 RX ORDER — OXYCODONE HYDROCHLORIDE AND ACETAMINOPHEN 5; 325 MG/1; MG/1
1 TABLET ORAL EVERY 4 HOURS PRN
Status: DISCONTINUED | OUTPATIENT
Start: 2020-04-30 | End: 2020-05-01 | Stop reason: HOSPADM

## 2020-04-30 RX ORDER — IBUPROFEN 600 MG/1
600 TABLET ORAL EVERY 6 HOURS PRN
Status: DISCONTINUED | OUTPATIENT
Start: 2020-04-30 | End: 2020-05-01 | Stop reason: HOSPADM

## 2020-04-30 RX ORDER — LIDOCAINE HYDROCHLORIDE 10 MG/ML
INJECTION, SOLUTION INFILTRATION; PERINEURAL
Status: COMPLETED
Start: 2020-04-30 | End: 2020-04-30

## 2020-04-30 RX ORDER — VITAMIN A ACETATE, BETA CAROTENE, ASCORBIC ACID, CHOLECALCIFEROL, .ALPHA.-TOCOPHEROL ACETATE, DL-, THIAMINE MONONITRATE, RIBOFLAVIN, NIACINAMIDE, PYRIDOXINE HYDROCHLORIDE, FOLIC ACID, CYANOCOBALAMIN, CALCIUM CARBONATE, FERROUS FUMARATE, ZINC OXIDE, CUPRIC OXIDE 3080; 12; 120; 400; 1; 1.84; 3; 20; 22; 920; 25; 200; 27; 10; 2 [IU]/1; UG/1; MG/1; [IU]/1; MG/1; MG/1; MG/1; MG/1; MG/1; [IU]/1; MG/1; MG/1; MG/1; MG/1; MG/1
1 TABLET, FILM COATED ORAL EVERY MORNING
Status: DISCONTINUED | OUTPATIENT
Start: 2020-04-30 | End: 2020-05-01 | Stop reason: HOSPADM

## 2020-04-30 RX ORDER — SODIUM CHLORIDE, SODIUM LACTATE, POTASSIUM CHLORIDE, CALCIUM CHLORIDE 600; 310; 30; 20 MG/100ML; MG/100ML; MG/100ML; MG/100ML
INJECTION, SOLUTION INTRAVENOUS PRN
Status: DISCONTINUED | OUTPATIENT
Start: 2020-04-30 | End: 2020-05-01 | Stop reason: HOSPADM

## 2020-04-30 RX ORDER — ACETAMINOPHEN 325 MG/1
325 TABLET ORAL EVERY 4 HOURS PRN
Status: DISCONTINUED | OUTPATIENT
Start: 2020-04-30 | End: 2020-05-01 | Stop reason: HOSPADM

## 2020-04-30 RX ORDER — OXYCODONE HYDROCHLORIDE AND ACETAMINOPHEN 5; 325 MG/1; MG/1
2 TABLET ORAL EVERY 4 HOURS PRN
Status: DISCONTINUED | OUTPATIENT
Start: 2020-04-30 | End: 2020-05-01 | Stop reason: HOSPADM

## 2020-04-30 RX ORDER — ONDANSETRON 4 MG/1
4 TABLET, ORALLY DISINTEGRATING ORAL EVERY 6 HOURS PRN
Status: DISCONTINUED | OUTPATIENT
Start: 2020-04-30 | End: 2020-05-01 | Stop reason: HOSPADM

## 2020-04-30 RX ORDER — SODIUM CHLORIDE, SODIUM LACTATE, POTASSIUM CHLORIDE, AND CALCIUM CHLORIDE .6; .31; .03; .02 G/100ML; G/100ML; G/100ML; G/100ML
1000 INJECTION, SOLUTION INTRAVENOUS
Status: COMPLETED | OUTPATIENT
Start: 2020-04-30 | End: 2020-05-01

## 2020-04-30 RX ORDER — SODIUM CHLORIDE, SODIUM LACTATE, POTASSIUM CHLORIDE, CALCIUM CHLORIDE 600; 310; 30; 20 MG/100ML; MG/100ML; MG/100ML; MG/100ML
INJECTION, SOLUTION INTRAVENOUS CONTINUOUS
Status: DISPENSED | OUTPATIENT
Start: 2020-04-30 | End: 2020-04-30

## 2020-04-30 RX ADMIN — LIDOCAINE HYDROCHLORIDE: 10 INJECTION, SOLUTION INFILTRATION; PERINEURAL at 07:58

## 2020-04-30 RX ADMIN — SODIUM CHLORIDE, POTASSIUM CHLORIDE, SODIUM LACTATE AND CALCIUM CHLORIDE 1000 ML: 600; 310; 30; 20 INJECTION, SOLUTION INTRAVENOUS at 04:45

## 2020-04-30 RX ADMIN — ROPIVACAINE HYDROCHLORIDE: 2 INJECTION, SOLUTION EPIDURAL; INFILTRATION; PERINEURAL at 04:11

## 2020-04-30 RX ADMIN — LIDOCAINE HYDROCHLORIDE,EPINEPHRINE BITARTRATE 5 ML: 15; .005 INJECTION, SOLUTION EPIDURAL; INFILTRATION; INTRACAUDAL; PERINEURAL at 04:00

## 2020-04-30 RX ADMIN — IBUPROFEN 600 MG: 600 TABLET ORAL at 08:25

## 2020-04-30 RX ADMIN — IBUPROFEN 600 MG: 600 TABLET ORAL at 23:50

## 2020-04-30 RX ADMIN — FENTANYL CITRATE 100 MCG: 50 INJECTION, SOLUTION INTRAMUSCULAR; INTRAVENOUS at 03:08

## 2020-04-30 RX ADMIN — DOCUSATE SODIUM 100 MG: 100 CAPSULE, LIQUID FILLED ORAL at 23:50

## 2020-04-30 RX ADMIN — OXYCODONE HYDROCHLORIDE AND ACETAMINOPHEN 1 TABLET: 5; 325 TABLET ORAL at 16:49

## 2020-04-30 RX ADMIN — SODIUM CHLORIDE, POTASSIUM CHLORIDE, SODIUM LACTATE AND CALCIUM CHLORIDE: 600; 310; 30; 20 INJECTION, SOLUTION INTRAVENOUS at 04:05

## 2020-04-30 RX ADMIN — VITAMIN A, VITAMIN C, VITAMIN D-3, VITAMIN E, VITAMIN B-1, VITAMIN B-2, NIACIN, VITAMIN B-6, CALCIUM, IRON, ZINC, COPPER 1 TABLET: 4000; 120; 400; 22; 1.84; 3; 20; 10; 1; 12; 200; 27; 25; 2 TABLET ORAL at 13:30

## 2020-04-30 RX ADMIN — OXYTOCIN 2000 ML/HR: 10 INJECTION, SOLUTION INTRAMUSCULAR; INTRAVENOUS at 09:21

## 2020-04-30 RX ADMIN — ROPIVACAINE HYDROCHLORIDE: 2 INJECTION, SOLUTION EPIDURAL; INFILTRATION at 04:11

## 2020-04-30 RX ADMIN — LIDOCAINE HYDROCHLORIDE,EPINEPHRINE BITARTRATE 3 ML: 15; .005 INJECTION, SOLUTION EPIDURAL; INFILTRATION; INTRACAUDAL; PERINEURAL at 04:30

## 2020-04-30 RX ADMIN — IBUPROFEN 600 MG: 600 TABLET ORAL at 13:30

## 2020-04-30 RX ADMIN — Medication 125 ML/HR: at 09:41

## 2020-04-30 SDOH — ECONOMIC STABILITY: FOOD INSECURITY: WITHIN THE PAST 12 MONTHS, THE FOOD YOU BOUGHT JUST DIDN'T LAST AND YOU DIDN'T HAVE MONEY TO GET MORE.: PATIENT DECLINED

## 2020-04-30 SDOH — ECONOMIC STABILITY: TRANSPORTATION INSECURITY
IN THE PAST 12 MONTHS, HAS THE LACK OF TRANSPORTATION KEPT YOU FROM MEDICAL APPOINTMENTS OR FROM GETTING MEDICATIONS?: PATIENT DECLINED

## 2020-04-30 SDOH — ECONOMIC STABILITY: FOOD INSECURITY: WITHIN THE PAST 12 MONTHS, YOU WORRIED THAT YOUR FOOD WOULD RUN OUT BEFORE YOU GOT MONEY TO BUY MORE.: PATIENT DECLINED

## 2020-04-30 SDOH — ECONOMIC STABILITY: TRANSPORTATION INSECURITY
IN THE PAST 12 MONTHS, HAS LACK OF TRANSPORTATION KEPT YOU FROM MEETINGS, WORK, OR FROM GETTING THINGS NEEDED FOR DAILY LIVING?: PATIENT DECLINED

## 2020-04-30 ASSESSMENT — EDINBURGH POSTNATAL DEPRESSION SCALE (EPDS)
I HAVE FELT SCARED OR PANICKY FOR NO GOOD REASON: YES, SOMETIMES
I HAVE BEEN SO UNHAPPY THAT I HAVE HAD DIFFICULTY SLEEPING: NOT VERY OFTEN
I HAVE BEEN SO UNHAPPY THAT I HAVE BEEN CRYING: ONLY OCCASIONALLY
I HAVE BLAMED MYSELF UNNECESSARILY WHEN THINGS WENT WRONG: YES, SOME OF THE TIME
I HAVE FELT SAD OR MISERABLE: NOT VERY OFTEN
I HAVE BEEN ABLE TO LAUGH AND SEE THE FUNNY SIDE OF THINGS: NOT QUITE SO MUCH NOW
I HAVE BEEN ANXIOUS OR WORRIED FOR NO GOOD REASON: YES, SOMETIMES
THINGS HAVE BEEN GETTING ON TOP OF ME: YES, SOMETIMES I HAVEN'T BEEN COPING AS WELL AS USUAL
THE THOUGHT OF HARMING MYSELF HAS OCCURRED TO ME: NEVER
I HAVE LOOKED FORWARD WITH ENJOYMENT TO THINGS: AS MUCH AS I EVER DID

## 2020-04-30 ASSESSMENT — LIFESTYLE VARIABLES
HAVE YOU EVER FELT YOU SHOULD CUT DOWN ON YOUR DRINKING: NO
EVER_SMOKED: NEVER
ALCOHOL_USE: NO
TOTAL SCORE: 0
TOTAL SCORE: 0
CONSUMPTION TOTAL: INCOMPLETE
EVER FELT BAD OR GUILTY ABOUT YOUR DRINKING: NO
TOTAL SCORE: 0
HAVE PEOPLE ANNOYED YOU BY CRITICIZING YOUR DRINKING: NO
EVER HAD A DRINK FIRST THING IN THE MORNING TO STEADY YOUR NERVES TO GET RID OF A HANGOVER: NO
DOES PATIENT WANT TO STOP DRINKING: NO

## 2020-04-30 ASSESSMENT — PATIENT HEALTH QUESTIONNAIRE - PHQ9
1. LITTLE INTEREST OR PLEASURE IN DOING THINGS: NOT AT ALL
SUM OF ALL RESPONSES TO PHQ9 QUESTIONS 1 AND 2: 0
2. FEELING DOWN, DEPRESSED, IRRITABLE, OR HOPELESS: NOT AT ALL
1. LITTLE INTEREST OR PLEASURE IN DOING THINGS: NOT AT ALL
SUM OF ALL RESPONSES TO PHQ9 QUESTIONS 1 AND 2: 0
2. FEELING DOWN, DEPRESSED, IRRITABLE, OR HOPELESS: NOT AT ALL

## 2020-04-30 ASSESSMENT — PAIN SCALES - GENERAL: PAIN_LEVEL: 6

## 2020-04-30 NOTE — L&D DELIVERY NOTE
Delivery Note    Pre-op diagnosis:   1. IUP at 40w3d  2. Spontaneous labor  3. RH negative     Post operative diagnosis:   1. Same as above  2. Delivered    Delivery Course:     Called to room for assistance by Gildardo Krueger CNM for recurrent late decelerations with pushing. Decelerations started at 0524 and I was called shortly after this time. FHT continued to show moderate variability and occasional accelerations. Baby did respond well to intermittent pushing and positional changes. Patient was able to push baby down to outlet position and at this time late decelerations down to 90s occurred. Therefore patient was verbally consented for vacuum assist.  We discussed potential risks involved with vacuum-assisted vaginal delivery including hematoma, scalp laceration, and intracranial hemorrhage.  Patient acknowledges risk and states that she would like for me to use the vacuum to assist in her vaginal delivery.  Therefore the Kiwi vacuum was applied to the flexion point and with the next contraction pressure was applied into the green zone of approximately 600 mmHg.  1 pull resulted in delivery of the head.  Pressure was then released.  Viable female  was delivered in the LOP position at 0 6:55 AM with Apgars of 8 and 9.  Delayed cord clamping was performed.  Fetal cry was noted immediately upon delivery.  Estimated blood loss approximately 200 cc.  Second-degree perineal laceration with a bilateral periurethral hemostatic laceration.  Perineal laceration was repaired using a 3-0 Vicryl in the normal sterile fashion.  Placenta delivered spontaneously intact with a three-vessel cord.  Mother was left with baby and father of baby bonding in room.    Anesthesia: epidural, lidocaine    EBL: 200 cc    Lacerations: perineal and bilateral periurethral     Specimen: none     Complications: none    Condition: mother and baby tolerated procedure well     [facility]

## 2020-04-30 NOTE — ANESTHESIA QCDR
2019 [facility] Clinical Data Registry (for Quality Improvement)     Postoperative nausea/vomiting risk protocol (Adult = 18 yrs and Pediatric 3-17 yrs)- (430 and 463)  General inhalation anesthetic (NOT TIVA) with PONV risk factors: No  Provision of anti-emetic therapy with at least 2 different classes of agents: N/A  Patient DID NOT receive anti-emetic therapy and reason is documented in Medical Record: N/A    Multimodal Pain Management- (477)  Non-emergent surgery AND patient age >= 18: No  Use of Multimodal Pain Management, two or more drugs and/or interventions, NOT including systemic opioids:   Exception: Documented allergy to multiple classes of analgesics:     Smoking Abstinence (404)  Patient is current smoker (cigarette, pipe, e-cig, marijuanna): No  Elective Surgery:   Abstinence instructions provided prior to day of surgery:   Patient abstained from smoking on day of surgery:     Pre-Op Beta-Blocker in Isolated CABG (44)  Isolated CABG AND patient age >= 18: No  Beta-blocker admin within 24 hours of surgical incision:   Exception:of medical reason(s) for not administering beta blocker within 24 hours prior to surgical incision (e.g., not  indicated,other medical reason):     PACU assessment of acute postoperative pain prior to Anesthesia Care End- Applies to Patients Age = 18- (ABG7)  Initial PACU pain score is which of the following: < 7/10  Patient unable to report pain score: N/A    Post-anesthetic transfer of care checklist/protocol to PACU/ICU- (426 and 427)  Upon conclusion of case, patient transferred to which of the following locations: PACU/Non-ICU  Use of transfer checklist/protocol: Yes  Exclusion: Service Performed in Patient Hospital Room (and thus did not require transfer): N/A  Unplanned admission to ICU related to anesthesia service up through end of PACU care- (MD51)  Unplanned admission to ICU (not initially anticipated at anesthesia start time): No

## 2020-04-30 NOTE — PROGRESS NOTES
0139-Pt presents to L&D c/o UC's that started around 6 pm and have become more intense and more frequent in the past couple hours. Pt uncomfortable, breathing through UC's. Denies LOF or VB and confirms +FM. SVE as charted. TOCO and EFM applied. VS taken. POC discussed  0147-Phoned BHUMIKA Krueger CNM, updated on pt arrival/complaint/status, orders to monitor for one hour then recheck  0234-SVE 5-6/80/-2. Phoned BHUMIKA Krueger CNM, updated on SVE, admit orders received  0255-Report given to ARGELIA Shah RN. POC discussed, pt transferred to S222

## 2020-04-30 NOTE — ANESTHESIA PREPROCEDURE EVALUATION
28 yo  40+5 melissa in labor    Relevant Problems   ANESTHESIA (within normal limits)       Physical Exam    Airway   Mallampati: II  TM distance: >3 FB  Neck ROM: full       Cardiovascular    Dental - normal exam         Pulmonary    Abdominal    Neurological - normal exam                 Anesthesia Plan    ASA 2       Plan - epidural   Neuraxial block will be labor analgesia              Pertinent diagnostic labs and testing reviewed    Informed Consent:    Anesthetic plan and risks discussed with patient.

## 2020-04-30 NOTE — ANESTHESIA TIME REPORT
Anesthesia Start and Stop Event Times     Date Time Event    4/30/2020 0357 Ready for Procedure     0357 Anesthesia Start     0655 Anesthesia Stop        Responsible Staff  04/30/20    Name Role Begin End    Ary Moreno M.D. Anesth 0357 0701    Aileen Daugherty M.D. Anesth 0701         Preop Diagnosis (Free Text):  Pre-op Diagnosis             Preop Diagnosis (Codes):    Post op Diagnosis  Pain during labor      Premium Reason  A. 3PM - 7AM    Comments:

## 2020-04-30 NOTE — PROGRESS NOTES
0935 -Patient transferred from labor and delivery. Two RN verification of infant and parent armbands. Report received from L&D, RN. Patient oriented to unit, call light, emergency light, and infant security. Assessment completed, fundus firm at u, lochia light. Patient has voided and is doing so without problems per L&D RN. Patient declines intervention for pain at this time. Pitocin infusing at 125 ml/hr. Patient encouraged to call with needs. Rounding in place. Bed is locked and in lowest position, call light within reach.

## 2020-04-30 NOTE — ANESTHESIA POSTPROCEDURE EVALUATION
Patient: Zoe Whitmore    Procedure Summary     Date:  04/30/20 Room / Location:      Anesthesia Start:  0357 Anesthesia Stop:  0655    Procedure:  Labor Epidural Diagnosis:      Scheduled Providers:   Responsible Provider:  Aileen Daugherty M.D.    Anesthesia Type:  epidural ASA Status:  2          Final Anesthesia Type: epidural  Last vitals  BP   Blood Pressure: 100/52    Temp   35.9 °C (96.6 °F)    Pulse   Pulse: (!) 103   Resp   18    SpO2   100 %      Anesthesia Post Evaluation    Patient location during evaluation: bedside  Patient participation: complete - patient participated  Level of consciousness: awake and alert  Pain score: 6    Airway patency: patent  Anesthetic complications: no  Cardiovascular status: adequate  Respiratory status: acceptable  Hydration status: acceptable    PONV: none           Nurse Pain Score: 9 (NPRS)

## 2020-04-30 NOTE — PROGRESS NOTES
0255: Report received from AMINTA Ren RN. POC discussed.    0308: IV started and labs sent.Medicated for pain.     0356: Dr. Moreno called for epidural placement.    0405: Epidural in place., tolerated well.    0421: BHUMIKA Krueger CNM at bedside to assess pt in person. Discussed POC.    0429: Dr. Moreno at bedside to bolus pt, pt c/o pain on left side that is unrelieved.    0449: BHUMIKA Krueger CNM at bedside. SVE 7/90/-1 and AROM, clear.    0451: Pt states that her pain is tolerable. Able to rest, no longer breathing through UC's.    0455: Michelle in place.    0511: SVE, 7cm exam by ARGELIA Shah RN.     0512: Pt repositioned due to FHT to far left side.    0515: Pt repositioned due to FHT for far right side with peanut ball.    0523: SVE, 8 cm exam by ARGELIA Shah RN.     0527: Pt due to FHT repositioned to far left side.    0530: Bolus given.    0532: BHUMIKA Krueger CNM notified of FHT, requested at bedside.    0535: BHUMIKA Krueger CNM at bedside to assess pt in person.     0536: SVE exam by BHUMIKA Kruegre CNM. CNM discussed POC with pt. Pt repositioned  Due to FHT to far left side with wedge supporting belly and and peanut ball in between legs. 0537: CNM remains at bedside for coaching and pushing with pt.    0550: Pt repositioned on on far left side, oxygen while continuing to push. CNM remains at bedside pushing with pt.    0603: Pt repositioned due to FHT to far right side. MD requested at bedside.    0604: Dr. Curtis at bedside to assess pt in person. MD and CNM remain at bedside pushing with pt.    0616: Pt repositioned in hands and knees position. Labor staff remains at bedside. Pt pushing in this position. Oxygen remains in place.    0622: Pt repositioned in lithotomy position.    0650: MD discussed vacuum placement for assistance with delivery, discussed risks and benefits, pt agrees to POC.    0652: AMINTA Ramos, charge RN notified that vacuum will be used.    0654: Vacuum on head. 0655: Vacuum suction applied and in  "green. Delivery of head 40 seconds later, vacuum suction released, No pop offs. SVE of viable female infant \"Maella\"  8/9 APGARS, 3VC.    0700: Report given to ria Liz RN. POC discussed.                          "

## 2020-04-30 NOTE — ANESTHESIA PROCEDURE NOTES
Epidural Block  Date/Time: 4/30/2020 4:00 AM  Performed by: Ary Moreno M.D.  Authorized by: Ary Moreno M.D.     Patient Location:  OB  Start Time:  4/30/2020 4:00 AM  End Time:  4/30/2020 4:10 AM  Reason for Block: labor analgesia    patient identified, IV checked, site marked, risks and benefits discussed, surgical consent, monitors and equipment checked, pre-op evaluation and timeout performed    Patient Position:  Sitting  Prep: ChloraPrep, patient draped and sterile technique    Monitoring:  Blood pressure, continuous pulse oximetry and heart rate  Approach:  Midline  Location:  L2-L3  Injection Technique:  DONTRELL saline  Skin infiltration:  Lidocaine  Strength:  1%  Dose:  3ml  Needle Type:  Tuohy  Needle Gauge:  17 G  Needle Length:  3.5 in  Loss of resistance::  4  Catheter Size:  19 G  Catheter at Skin Depth:  8  Test Dose Result:  Negative   Patient with primarily right sided block.  Epidural catheter repositioned to 7cm at skin and rebolused.  Pt more comfortable on left side after change.

## 2020-04-30 NOTE — H&P
Admission History and Physical      Zoe Whitmore is a 29 y.o. female  at 40w5d who presents for increasing abdominal pain    Subjective:   positive  For CTXS.   positive Feels pain   negative for LOF  negative for vaginal bleeding.   positive for fetal movement    She reports that cramping has become progressively worse with some pressure. She was initially evaluated and found to be 3 cm. Within 1 hour, she had increasing pain and was noted to be 5-6 cm.     Pregnancy notable for transfer of care from [place] where she had 3 prenatal vitis.     ROS: A comprehensive review of systems was negative.    Past Medical History:   Diagnosis Date   • Anemia    • Rh negative state in antepartum period 2020     No past surgical history on file.  OB History    Para Term  AB Living   3 1 1   1 1   SAB TAB Ectopic Molar Multiple Live Births   1         1      # Outcome Date GA Lbr Ambrocio/2nd Weight Sex Delivery Anes PTL Lv   3 Current            2 Term 17 41w5d   F Vag-Spont  N MÓNICA      Birth Comments: Pt states baby had jaundice   1 2016 9w0d             Birth Comments: Pt states no D/C needed.      Social History     Socioeconomic History   • Marital status: Single     Spouse name: Not on file   • Number of children: Not on file   • Years of education: Not on file   • Highest education level: Not on file   Occupational History   • Not on file   Social Needs   • Financial resource strain: Not on file   • Food insecurity     Worry: Patient refused     Inability: Patient refused   • Transportation needs     Medical: Patient refused     Non-medical: Patient refused   Tobacco Use   • Smoking status: Never Smoker   • Smokeless tobacco: Never Used   Substance and Sexual Activity   • Alcohol use: Not Currently   • Drug use: Never   • Sexual activity: Yes     Partners: Male     Birth control/protection: None   Lifestyle   • Physical activity     Days per week: Not on file     Minutes per session:  Not on file   • Stress: Not on file   Relationships   • Social connections     Talks on phone: Not on file     Gets together: Not on file     Attends Gnosticism service: Not on file     Active member of club or organization: Not on file     Attends meetings of clubs or organizations: Not on file     Relationship status: Not on file   • Intimate partner violence     Fear of current or ex partner: Not on file     Emotionally abused: Not on file     Physically abused: Not on file     Forced sexual activity: Not on file   Other Topics Concern   • Not on file   Social History Narrative   • Not on file     Allergies: Sulfa drugs    Current Facility-Administered Medications:   •  LR infusion, , Intravenous, Continuous, Jamilah Montana D.O., Last Rate: 125 mL/hr at 04/30/20 0405  •  fentaNYL (SUBLIMAZE) injection 50 mcg, 50 mcg, Intravenous, Q HOUR PRN, Jamilah Montana D.O.  •  fentaNYL (SUBLIMAZE) injection 100 mcg, 100 mcg, Intravenous, Q HOUR PRN, BANG Pimentel.OArely, 100 mcg at 04/30/20 0308  •  mag hydrox-al hydrox-simeth (MAALOX PLUS ES or MYLANTA DS) suspension 30 mL, 30 mL, Oral, Q6HRS PRN, BANG Pimentel.O.  •  oxytocin (PITOCIN) 20 UNITS/1000ML LR, , , , , Stopped at 04/30/20 0300  •  [COMPLETED] lactated ringers (BOLUS) infusion, 1,000 mL, Intravenous, Once PRN, Ary Moreno M.D., Last Rate: 1,000 mL/hr at 04/30/20 0445, 1,000 mL at 04/30/20 0445  •  ropivacaine (NAROPIN) injection, , Epidural, Continuous, Ary Moreno M.D.  •  ePHEDrine injection 5 mg, 5 mg, Intravenous, Q5 MIN PRN **AND** Notify Anesthesiologist if ephedrine given and for sustained hypotension (more than 2 minutes), , , Once **AND** For Hypotension: Place patient in left lateral tilt to achieve uterine displacement and elevate legs., , , PRN **AND** Hypotension: Oxygen Continuous, , , CONTINUOUS **AND** lactated ringers infusion (BOLUS), 250 mL, Intravenous, PRN, Ary Moreno M.D.    [facility] with [facility]  starting at 20 with following problems:  Patient Active Problem List    Diagnosis Date Noted   • Rh negative state in antepartum period - Rhogam given 2/5/20 02/05/2020   • Supervision of other normal pregnancy, antepartum 12/13/2019       Last US at 21 weeks:  12/19/2019 8:58 AM     HISTORY/REASON FOR EXAM:  Evaluate fetal anatomy, late care     TECHNIQUE/EXAM DESCRIPTION: OB complete ultrasound.     COMPARISON:  None     FINDINGS:  Fetal Lie:  Vertex  LMP:  7/20/2019  Clinical BEL by LMP:  4/25/2020     Placenta (Location):  Posterior  Placenta Previa: No  Placental Grade: I     Amniotic Fluid Volume:  PARISH = 12.73 cm     Fetal Heart Rate:  149 bpm     Cervical Length:  4.50 cm transabdominal     No maternal adnexal mass is identified.     Umbilical Artery S/D Ratio(s):  Not applicable     Fetal Anatomy  (Seen or Not Seen)  Lateral Ventricles     Seen  Cisterna Magna        Seen  Cerebellum              Seen  CSP             Seen  Orbits             Seen  Face/Lips                Seen  Cord Insertion         Seen  Placental CI         Seen  4 Chamber Heart     Seen  LVOT               Seen  RVOT              Seen  3 Vessel View     Seen  Stomach       Seen  Kidneys                   Seen  Urinary Bladder      Seen  Spine                       Seen  3 Vessel Cord          Seen  Both Upper Extremities    Seen  Both Lower Extremities    Seen  Diaphragm             Seen  Movement       Seen  Gender:  Likely female     Fetal Biometry  BPD    5.32 cm, 22 weeks, 1 day  HC    19.55 cm, 21 weeks, 5 days  AC    16.91 cm, 21 weeks, 6 days  Femur Length    3.81 cm, 22 weeks, 1 day  Humerus Length    3.51 cm, 22 weeks  Cerebellum Diameter   2.32 cm     EGA by this US:  21 weeks, 6 days  BEL by this US: 4/24/2020  BEL by 1st US:  Not applicable     Estimated Fetal Weight:  469 grams  EFW Percentile: 58.9%     Comments:     IMPRESSION:     1.  Single intrauterine pregnancy of an estimated gestational age of 21 weeks, 6 days with  "an estimated date of delivery of 4/24/2020.  2.  Fetal survey within normal limits.        Objective:      /62   Pulse (!) 110   Temp 36.3 °C (97.4 °F) (Temporal)   Resp 18   Ht 1.6 m (5' 3\")   Wt 80.7 kg (178 lb)   SpO2 100%     General:   no acute distress, alert   Skin:   normal   HEENT:  PERRLA   Lungs:   CTA bilateral   Heart:   S1, S2 normal, no murmur, click, rub or gallop, regular rate and rhythm, peripheral pulses very brisk, chest is clear without rales or wheezing, no pedal edema, no hepatosplenomegaly   Abdomen:   gravid, NT   EFW:  3400g   Pelvis:  adequate, diagnonal conjugate not met   FHT:  140 BPM   Uterine Size: S=D   Presentations: Cephalic   Cervix:     Dilation: 5-6    Effacement: 80    Station:  -2    Consistency: Soft    Position: Middle     Lab Review  Lab:   Blood type: O     Recent Results (from the past 5880 hour(s))   OP Prenatal Panel-Blood Bank    Collection Time: 09/19/19 12:00 AM   Result Value Ref Range    ABO Grouping Only O    RUBELLA    Collection Time: 09/19/19 12:00 AM   Result Value Ref Range    Rubella IgG Antibody Immune    HEPATITIS B SURFACE ANTIGEN    Collection Time: 09/19/19 12:00 AM   Result Value Ref Range    Hepatitis B Surface Antigen Negative    OP Prenatal Panel-Blood Bank    Collection Time: 09/19/19 12:00 AM   Result Value Ref Range    Rh Grouping Only Negative     Antibody Screen Scrn Negative    POCT Urinalysis    Collection Time: 12/13/19  2:49 PM   Result Value Ref Range    POC Color yellow Negative    POC Appearance cloudy Negative    POC Leukocyte Esterase negative Negative    POC Nitrites positive Negative    POC Urobiligen      POC Protein negative Negative mg/dL    POC Urine PH 7.0 5.0 - 8.0    POC Blood negative Negative    POC Specific Gravity 1.025 <1.005 - >1.030    POC Ketones negative Negative mg/dL    POC Bilirubin      POC Glucose negative Negative mg/dL   T.PALLIDUM AB EIA    Collection Time: 01/10/20 10:31 AM   Result Value Ref " Range    Syphilis, Treponemal Qual Non Reactive Non Reactive   HGB    Collection Time: 01/10/20 10:31 AM   Result Value Ref Range    Hemoglobin 11.8 (L) 12.0 - 16.0 g/dL   HCT    Collection Time: 01/10/20 10:31 AM   Result Value Ref Range    Hematocrit 36.5 (L) 37.0 - 47.0 %   GLUCOSE 1HR GESTATIONAL    Collection Time: 01/10/20 10:31 AM   Result Value Ref Range    Glucose, Post Dose 76 70 - 139 mg/dL   GRP B STREP, BY PCR (DAY BROTH)    Collection Time: 04/01/20  9:12 AM   Result Value Ref Range    Strep Gp B DNA PCR Negative Negative   Hold Blood Bank Specimen (Not Tested)    Collection Time: 04/30/20  3:08 AM   Result Value Ref Range    Holding Tube - Bb DONE    CBC WITH DIFFERENTIAL    Collection Time: 04/30/20  3:08 AM   Result Value Ref Range    WBC 11.1 (H) 4.8 - 10.8 K/uL    RBC 4.12 (L) 4.20 - 5.40 M/uL    Hemoglobin 11.7 (L) 12.0 - 16.0 g/dL    Hematocrit 34.9 (L) 37.0 - 47.0 %    MCV 84.7 81.4 - 97.8 fL    MCH 28.4 27.0 - 33.0 pg    MCHC 33.5 (L) 33.6 - 35.0 g/dL    RDW 41.7 35.9 - 50.0 fL    Platelet Count 280 164 - 446 K/uL    MPV 9.9 9.0 - 12.9 fL    Neutrophils-Polys 74.30 (H) 44.00 - 72.00 %    Lymphocytes 17.90 (L) 22.00 - 41.00 %    Monocytes 6.20 0.00 - 13.40 %    Eosinophils 0.60 0.00 - 6.90 %    Basophils 0.30 0.00 - 1.80 %    Immature Granulocytes 0.70 0.00 - 0.90 %    Nucleated RBC 0.00 /100 WBC    Neutrophils (Absolute) 8.25 (H) 2.00 - 7.15 K/uL    Lymphs (Absolute) 1.99 1.00 - 4.80 K/uL    Monos (Absolute) 0.69 0.00 - 0.85 K/uL    Eos (Absolute) 0.07 0.00 - 0.51 K/uL    Baso (Absolute) 0.03 0.00 - 0.12 K/uL    Immature Granulocytes (abs) 0.08 0.00 - 0.11 K/uL    NRBC (Absolute) 0.00 K/uL          Assessment:   Zoe Whitmore at 40w5d  Labor status: Active phase labor.  Obstetrical history significant for   Patient Active Problem List    Diagnosis Date Noted   • Rh negative state in antepartum period - Rhogam given 2/5/20 02/05/2020   • Supervision of other normal pregnancy, antepartum  12/13/2019   .      Plan:     1. Admit to L&D  2. GBS negative   3. Desires epidural for pain relief. May have when desires   4. Plan at this time is expectant management . Consider AROM for augmentation if appropriate.       Charlotte BELTRE/ Dr. Montana Attending

## 2020-05-01 VITALS
HEIGHT: 63 IN | TEMPERATURE: 97.2 F | RESPIRATION RATE: 18 BRPM | WEIGHT: 178 LBS | OXYGEN SATURATION: 98 % | SYSTOLIC BLOOD PRESSURE: 91 MMHG | HEART RATE: 63 BPM | BODY MASS INDEX: 31.54 KG/M2 | DIASTOLIC BLOOD PRESSURE: 58 MMHG

## 2020-05-01 PROCEDURE — 700102 HCHG RX REV CODE 250 W/ 637 OVERRIDE(OP): Performed by: OBSTETRICS & GYNECOLOGY

## 2020-05-01 PROCEDURE — A9270 NON-COVERED ITEM OR SERVICE: HCPCS | Performed by: OBSTETRICS & GYNECOLOGY

## 2020-05-01 RX ORDER — PSEUDOEPHEDRINE HCL 30 MG
100 TABLET ORAL 2 TIMES DAILY PRN
Qty: 60 CAP | Refills: 4 | Status: SHIPPED | OUTPATIENT
Start: 2020-05-01 | End: 2020-05-01

## 2020-05-01 RX ORDER — IBUPROFEN 600 MG/1
600 TABLET ORAL EVERY 6 HOURS PRN
Qty: 30 TAB | Refills: 0 | Status: SHIPPED | OUTPATIENT
Start: 2020-05-01 | End: 2020-05-19

## 2020-05-01 RX ORDER — FERROUS SULFATE 325(65) MG
325 TABLET ORAL DAILY
Qty: 30 TAB | Refills: 4 | Status: SHIPPED | OUTPATIENT
Start: 2020-05-01 | End: 2020-05-01

## 2020-05-01 RX ADMIN — OXYCODONE HYDROCHLORIDE AND ACETAMINOPHEN 1 TABLET: 5; 325 TABLET ORAL at 04:33

## 2020-05-01 RX ADMIN — VITAMIN A, VITAMIN C, VITAMIN D-3, VITAMIN E, VITAMIN B-1, VITAMIN B-2, NIACIN, VITAMIN B-6, CALCIUM, IRON, ZINC, COPPER 1 TABLET: 4000; 120; 400; 22; 1.84; 3; 20; 10; 1; 12; 200; 27; 25; 2 TABLET ORAL at 05:51

## 2020-05-01 NOTE — DISCHARGE INSTRUCTIONS
POSTPARTUM DISCHARGE INSTRUCTIONS FOR MOM    YOB: 1990   Age: 29 y.o.               Admit Date: 2020     Discharge Date: 2020  Attending Doctor:  Jamilah Montana D.O.                  Allergies:  Sulfa drugs    Discharged to home by car. Discharged via wheelchair, hospital escort: Yes.  Special equipment needed: Not Applicable  Belongings with: Personal  Be sure to schedule a follow-up appointment with your primary care doctor or any specialists as instructed.     Discharge Plan:   Diet Plan: Discussed  Activity Level: Discussed  Confirmed Follow up Appointment: Patient to Call and Schedule Appointment  Confirmed Symptoms Management: Discussed  Medication Reconciliation Updated: Yes  Influenza Vaccine Indication: Patient Refuses    REASONS TO CALL YOUR OBSTETRICIAN:  1.   Persistent fever or shaking chills (Temperature higher than 100.4)  2.   Heavy bleeding (soaking more than 1 pad per hour); Passing clots  3.   Foul odor from vagina  4.   Mastitis (Breast infection; breast pain, chills, fever, redness)  5.   Urinary pain, burning or frequency  6.   Episiotomy infection  7.   Abdominal incision infection  8.   Severe depression longer than 24 hours    HAND WASHING  · Prior to handling the baby.  · Before breastfeeding or bottle feeding baby.  · After using the bathroom or changing the baby's diaper.    WOUND CARE  Ask your physician for additional care instructions.  In general:    ·  Incision:      · Keep clean and dry.    · Do NOT lift anything heavier than your baby for up to 6 weeks.    · There should not be any opening or pus.      VAGINAL CARE  · Nothing inside vagina for 6 weeks: no sexual intercourse, tampons or douching.  · Bleeding may continue for 2-4 weeks.  Amount may vary.    · Call your physician for heavy bleeding which means soaking more than 1 pad per hour    BIRTH CONTROL  · It is possible to become pregnant at any time after delivery and while  "breastfeeding.  · Plan to discuss a method of birth control with your physician at your follow up visit. visit.    DIET AND ELIMINATION  · Eating more fiber (bran cereal, fruits, and vegetables) and drinking plenty of fluids will help to avoid constipation.  · Urinary frequency after childbirth is normal.    POSTPARTUM BLUES  During the first few days after birth, you may experience a sense of the \"blues\" which may include impatience, irritability or even crying.  These feeling come and go quickly.  However, as many as 1 in 10 women experience emotional symptoms known as postpartum depression.    Postpartum depression:  May start as early as the second or third day after delivery or take several weeks or months to develop.  Symptoms of \"blues\" are present, but are more intense:  Crying spells; loss of appetite; feelings of hopelessness or loss of control; fear of touching the baby; over concern or no concern at all about the baby; little or no concern about your own appearance/caring for yourself; and/or inability to sleep or excessive sleeping.  Contact your physician if you are experiencing any of these symptoms.    Crisis Hotline:  · [place] Crisis Hotline:  8-384-TFHFQGI  Or 1-201.833.9248  · [place] Crisis Hotline:  1-414.722.5018  Or 809-390-2526    PREVENTING SHAKEN BABY:  If you are angry or stressed, PUT THE BABY IN THE CRIB, step away, take some deep breaths, and wait until you are calm to care for the baby.  DO NOT SHAKE THE BABY.  You are not alone, call a supporter for help.    · Crisis Call Center 24/7 crisis line 586-412-9157 or 1-688.800.8776  · You can also text them, text \"ANSWER\" to 355794    QUIT SMOKING/TOBACCO USE:  I understand the use of any tobacco products increases my chance of suffering from future heart disease and could cause other illnesses which may shorten my life. Quitting the use of tobacco products is the single most important thing I can do to improve my health. For further " information on smoking / tobacco cessation call a Toll Free Quit Line at 1-286.772.3595 (*National Cancer [place]) or 1-181.953.8476 (American Lung Association) or you can access the web based program at www.lungusa.org.    · [place] Tobacco Users Help Line:  (592) 695-3548       Toll Free: 1-594.887.2665  · Quit Tobacco Program [facility] (806)754-4972    DEPRESSION / SUICIDE RISK:  As you are discharged from this [facility], it is important to learn how to keep safe from harming yourself.    Recognize the warning signs:  · Abrupt changes in personality, positive or negative- including increase in energy   · Giving away possessions  · Change in eating patterns- significant weight changes-  positive or negative  · Change in sleeping patterns- unable to sleep or sleeping all the time   · Unwillingness or inability to communicate  · Depression  · Unusual sadness, discouragement and loneliness  · Talk of wanting to die  · Neglect of personal appearance   · Rebelliousness- reckless behavior  · Withdrawal from people/activities they love  · Confusion- inability to concentrate     If you or a loved one observes any of these behaviors or has concerns about self-harm, here's what you can do:  · Talk about it- your feelings and reasons for harming yourself  · Remove any means that you might use to hurt yourself (examples: pills, rope, extension cords, firearm)  · Get professional help from the community (Mental Health, Substance Abuse, psychological counseling)  · Do not be alone:Call your Safe Contact- someone whom you trust who will be there for you.  · Call your local CRISIS HOTLINE 238-6527 or 763-860-5325  · Call your local Children's Mobile Crisis Response Team [place] (727) 710-4035 or www.[employer]  · Call the toll free National Suicide Prevention Hotlines   · National Suicide Prevention Lifeline 474-923-SPMT (0614)  · National Hope Line Network 800-SUICIDE  (373-3916)    DISCHARGE SURVEY:  Thank you for choosing [facility].  We hope we provided you with very good care.  You may be receiving a survey in the mail.  Please fill it out.  Your opinion is valuable to us.    ADDITIONAL EDUCATIONAL MATERIALS GIVEN TO PATIENT:        My signature on this form indicates that:  1.  I have reviewed and understand the above information  2.  My questions regarding this information have been answered to my satisfaction.  3.  I have formulated a plan with my discharge nurse to obtain my prescribed medication for home.

## 2020-05-01 NOTE — CARE PLAN
Problem: Altered physiologic condition related to immediate post-delivery state and potential for bleeding/hemorrhage  Goal: Patient physiologically stable as evidenced by normal lochia, palpable uterine involution and vital signs within normal limits  Outcome: PROGRESSING AS EXPECTED  Note: VSS and within normal parameters. Fundus palpable and firm, lochia light rubra. Will continue to monitor.  Pt. States BP runs low and ambulating without difficulty, lightheadedness or dizziness. Will continue to monitor.     Problem: Alteration in comfort related to episiotomy, vaginal repair and/or after birth pains  Goal: Patient verbalizes acceptable pain level  Outcome: PROGRESSING AS EXPECTED  Note: Pain management discussed with pt. Will notify this RN if PRN pain medication is needed. Will continue to monitor.

## 2020-05-01 NOTE — LACTATION NOTE
Mom P2 who delivered baby girl weighing 7# 13 oz at  40.5 Mom reports that baby is feeding well and she was successful with her first baby. Mom reports darker and enlarged areolas during pregnancy. Mom denies any breast surgeries, diabetes, thyroid or fertility issues. Baby has lost 2.68 % and has adequate voids and stools for day of life. Mom is preparing for discharge and was given [facility] paperwork for a breast pump. Mom has no concerns with nursing baby and has a follow up appointment with peds on Monday.    Reviewed normal  behavior and feeding patterns. Encouraged to do skin to skin during waking hours and feed when noting early feeding cues of licking lips, stirring in sleep and putting hand to mouth. Note that crying is a late feeding cue. Offer both breast at every feeding and hand massage during feed to help with colostrum release.  Demand feed baby 10 or more times in 24 hours. Be aware that periods of cluster feeding are normal. Note rhythmic, effective jaw glide View [employer] video website. Hand express onto nipple before and after feedings. If baby not latching, hand express into spoon and feed back. Mom may hand express after pumping to collect more colostrum.Observe for voids and stools and document on feeding log. Noted stool's transitioning colorFollow up with pediatrician as instructed. If any concerns arise regarding feedings, jaundice levels, decreased output, or  consistently poor feedings, call baby's doctor.    Provided handouts for outpatient breastfeeding support Zoom meeting and phone number for private lactation consults

## 2020-05-01 NOTE — PROGRESS NOTES
1915 - Received report from ALANNA Jay. Assumed care of pt. Plan of care discussed.    Assessment complete. Fundus firm and palpable, lochia light rubra. Pain management and interventions discussed with pt. Pt. Will notify this RN if PRN pain medication is needed. Pt. Bonding with infant well. Infant breastfeeding well per pt. Pt states wanting infant's head to be washed tonight and holding off on a bath until discharged home. All other questions and concerns discussed at this time. No further needs. Encouraged pt. To call with needs. Will continue to monitor.

## 2020-05-01 NOTE — DISCHARGE SUMMARY
Discharge Summary:      Zoe Whitmore    Admit Date:   2020  Discharge Date:  2020     Admitting diagnosis:  Pregnancy  Labor and delivery indication for care or intervention  Discharge Diagnosis: Status post vacuum extraction.  Pregnancy Complications: none  Tubal Ligation:  no        History:  Past Medical History:   Diagnosis Date   • Anemia    • Rh negative state in antepartum period 2020     OB History    Para Term  AB Living   3 2 2   1 2   SAB TAB Ectopic Molar Multiple Live Births   1       0 2      # Outcome Date GA Lbr Ambrocio/2nd Weight Sex Delivery Anes PTL Lv   3 Term 20 40w5d 04:34 / 00:51 3.545 kg (7 lb 13 oz) F Vag-Spont EPI N MÓNICA   2 Term 17 41w5d  2.948 kg (6 lb 8 oz) F Vag-Spont  N MÓNICA      Birth Comments: Pt states baby had jaundice   1 SAB  9w0d             Birth Comments: Pt states no D/C needed.         Sulfa drugs  Patient Active Problem List    Diagnosis Date Noted   • Rh negative state in antepartum period - Rhogam given 2020   • Supervision of other normal pregnancy, antepartum 2019        Hospital Course:   29 y.o. , now para 2, was admitted with the above mentioned diagnosis, underwent Active Labor, vacuum extraction. Patient postpartum course was unremarkable, with progressive advancement in diet , ambulation and toleration of oral analgesia. Patient without complaints today and desires discharge.      Vitals:    20 1400 20 1800 20 2200 20 0600   BP: (!) 97/55 (!) 95/58 102/55 (!) 91/58   Pulse: 80 78 90 63   Resp: 19 18 17 18   Temp: 36.5 °C (97.7 °F) 36.6 °C (97.8 °F) 36.2 °C (97.1 °F) 36.2 °C (97.2 °F)   TempSrc: Temporal Temporal Temporal Temporal   SpO2: 96% 94% 97% 98%   Weight:       Height:           Current Facility-Administered Medications   Medication Dose   • ondansetron (ZOFRAN ODT) dispertab 4 mg  4 mg    Or   • ondansetron (ZOFRAN) syringe/vial injection 4 mg  4 mg   • oxytocin  (PITOCIN) infusion (for postpartum)   mL/hr   • ibuprofen (MOTRIN) tablet 600 mg  600 mg   • acetaminophen (TYLENOL) tablet 325 mg  325 mg   • oxyCODONE-acetaminophen (PERCOCET) 5-325 MG per tablet 1 Tab  1 Tab   • oxyCODONE-acetaminophen (PERCOCET) 5-325 MG per tablet 2 Tab  2 Tab   • ropivacaine (NAROPIN) injection     • LR infusion     • PRN oxytocin (PITOCIN) (20 Units/1000 mL) PRN for excessive uterine bleeding - See Admin Instr  125-999 mL/hr   • miSOPROStol (CYTOTEC) tablet 600 mcg  600 mcg   • docusate sodium (COLACE) capsule 100 mg  100 mg   • prenatal plus vitamin (STUARTNATAL 1+1) 27-1 MG tablet 1 Tab  1 Tab       Exam:  Breast Exam: negative  Abdomen: Abdomen soft, non-tender. BS normal. No masses,  No organomegaly  Fundus Non Tender: yes  Extremity: extremities, peripheral pulses and reflexes normal     Labs:  Recent Labs     04/30/20  0308 04/30/20  1551   WBC 11.1* 11.5*   RBC 4.12* 3.54*   HEMOGLOBIN 11.7* 10.1*   HEMATOCRIT 34.9* 31.8*   MCV 84.7 89.8   MCH 28.4 28.5   MCHC 33.5* 31.8*   RDW 41.7 45.0   PLATELETCT 280 216   MPV 9.9 9.9        Activity:   Discharge to home  Pelvic Rest x 6 weeks    Assessment:  normal postpartum course  Discharge Assessment: No areas of skin breakdown/redness; surgical incision intact/healing     Follow up: [facility] in 5 weeks for vaginal ; 1 week for incision check.      Discharge Meds:   Current Outpatient Medications   Medication Sig Dispense Refill   • ibuprofen (MOTRIN) 600 MG Tab Take 1 Tab by mouth every 6 hours as needed (For cramping after delivery; do not give if patient is receiving ketorolac (Toradol)). 30 Tab 0       Jamilah Montana D.O.

## 2020-05-01 NOTE — CARE PLAN
Problem: Potential for postpartum infection related to presence of episiotomy/vaginal tear and/or uterine contamination  Goal: Patient will be absent from signs and symptoms of infection  Note: Patient is absent from signs or symptoms of infection.     Problem: Alteration in comfort related to episiotomy, vaginal repair and/or after birth pains  Goal: Patient verbalizes acceptable pain level  Note: Patient verbalizes acceptable pain level

## 2020-05-06 ENCOUNTER — TELEPHONE (OUTPATIENT)
Dept: OBGYN | Facility: CLINIC | Age: 30
End: 2020-05-06

## 2020-05-06 NOTE — TELEPHONE ENCOUNTER
Shobha from [facility] called stating she is trying to get a breast pump for pt. Will send information that is needed on prescription and fax # to re-fax information.     Shobha from [facility] called in regards to pt.'s breast pump Rx, Called unable to contact her left message to call back.

## 2020-05-14 NOTE — TELEPHONE ENCOUNTER
Shobha from [facility] on VM calling regarding Breast pump Rx for pt. Called Shobha back, [facility] reach [facility]

## 2020-05-15 RX ORDER — BREAST PUMP
EACH MISCELLANEOUS
Qty: 1 EACH | Refills: 0 | Status: SHIPPED | OUTPATIENT
Start: 2020-05-15

## 2020-05-19 ENCOUNTER — TELEMEDICINE (OUTPATIENT)
Dept: TELEHEALTH | Facility: TELEMEDICINE | Age: 30
End: 2020-05-19
Payer: COMMERCIAL

## 2020-05-19 VITALS — RESPIRATION RATE: 16 BRPM | HEIGHT: 63 IN | BODY MASS INDEX: 28 KG/M2 | WEIGHT: 158 LBS

## 2020-05-19 DIAGNOSIS — R39.89 SUSPECTED UTI: ICD-10-CM

## 2020-05-19 PROCEDURE — 99203 OFFICE O/P NEW LOW 30 MIN: CPT | Mod: CR | Performed by: PHYSICIAN ASSISTANT

## 2020-05-19 RX ORDER — CEFDINIR 300 MG/1
300 CAPSULE ORAL EVERY 12 HOURS
Qty: 10 CAP | Refills: 0 | Status: SHIPPED | OUTPATIENT
Start: 2020-05-19 | End: 2020-05-24

## 2020-05-19 ASSESSMENT — ENCOUNTER SYMPTOMS
DIARRHEA: 0
COUGH: 0
FLANK PAIN: 0
NAUSEA: 1
ABDOMINAL PAIN: 0
EYE REDNESS: 0
MYALGIAS: 1
EYE DISCHARGE: 0
FALLS: 0
VOMITING: 0
CHILLS: 1

## 2020-05-20 ENCOUNTER — HOSPITAL ENCOUNTER (OUTPATIENT)
Facility: MEDICAL CENTER | Age: 30
End: 2020-05-20
Attending: PHYSICIAN ASSISTANT
Payer: COMMERCIAL

## 2020-05-20 PROCEDURE — 87086 URINE CULTURE/COLONY COUNT: CPT

## 2020-05-20 NOTE — PROGRESS NOTES
Telemedicine Visit: New patient     This encounter was conducted via ZOOM  Verbal consent was obtained. Patient's identity was verified.    Subjective:   CC: Suspected UTI.   Zoe Whitmore is a 29 y.o. female presenting for evaluation and management of:    Patient is a 29-year-old female via telemedicine who expresses concern regarding possible urinary tract infection.  Patient is status post vaginal delivery on 4-30-she does report notable dysuria and vaginal pain after delivery however was feeling much better.  She also reports intermittent spotting however this is persisted since delivery.  She however reports yesterday she developed dysuria with worsening burning today.  She also reports fatigue along with notable nausea that began today as well.  She does report feeling slightly hot and cold this afternoon however is uncertain regarding fevers.  She does report various aches and pains however is uncertain if this is related to recent illness or recent delivery.  She is currently breast-feeding at this time.  She does deny recent sexual activity.  She denies any abdominal pain.    Dysuria    This is a new problem. The current episode started yesterday. The problem occurs every urination. The problem has been gradually worsening. She is not sexually active. There is no history of pyelonephritis. Associated symptoms include chills, a discharge, nausea and urgency. Pertinent negatives include no flank pain, frequency, possible pregnancy or vomiting. Associated symptoms comments: Reports vaginal spotting. She has tried nothing for the symptoms.       ROS    Review of Systems   Unable to perform ROS  Constitutional: Positive for chills and malaise/fatigue.   HENT: Negative for congestion.    Eyes: Negative for discharge and redness.   Respiratory: Negative for cough.    Gastrointestinal: Positive for nausea. Negative for abdominal pain, diarrhea and vomiting.   Genitourinary: Positive for dysuria and urgency.  Negative for flank pain and frequency.   Musculoskeletal: Positive for myalgias. Negative for falls and joint pain.     Allergies   Allergen Reactions   • Sulfa Drugs      Pt states has hard time breathing       Current medicines (including changes today)  Current Outpatient Medications   Medication Sig Dispense Refill   • cefdinir (OMNICEF) 300 MG Cap Take 1 Cap by mouth every 12 hours for 5 days. 10 Cap 0   • Prenatal MV-Min-Fe Fum-FA-DHA (PRENATAL 1 PO) Take  by mouth.     • Misc. Devices (BREAST PUMP) Misc Dispense 1 pump with all accessories once; Delivery date:4/30/20 ICD 10- Z39.1  Indications: Pregnant or Lactating Females 1 Each 0     No current facility-administered medications for this visit.        Patient Active Problem List    Diagnosis Date Noted   • Rh negative state in antepartum period - Rhogam given 2/5/20 02/05/2020   • Supervision of other normal pregnancy, antepartum 12/13/2019       Family History   Problem Relation Age of Onset   • No Known Problems Mother    • Cancer Father         colon   • No Known Problems Brother        She  has a past medical history of Anemia and Rh negative state in antepartum period (2/5/2020). She also has no past medical history of Addisons disease ([facility]), Adrenal disorder ([facility]), Allergy, Anxiety, Arrhythmia, Arthritis, Asthma, Cancer ([facility]), Cataract, CHF (congestive heart failure) ([facility]), Clotting disorder ([facility]), COPD (chronic obstructive pulmonary disease) ([facility]), Cushings syndrome ([facility]), Depression, Diabetes ([facility]), Diabetic neuropathy ([facility]), GERD (gastroesophageal reflux disease), Glaucoma, Goiter, Head ache, Heart attack ([facility]), Heart murmur, HIV (human immunodeficiency virus infection) ([facility]), Hyperlipidemia, Hypertension, IBD (inflammatory bowel disease), Kidney disease, Meningitis, Migraine, Muscle disorder, Osteoporosis, Parathyroid disorder ([facility]), Pituitary disease ([facility]), Pulmonary emphysema ([facility]), Seizure ([facility]), Sickle cell disease ([facility]), Stroke ([facility]),  "Substance abuse ([facility]), Thyroid disease, Tuberculosis, or Urinary tract infection.  She  has no past surgical history on file.       Objective:   Resp 16   Ht 1.6 m (5' 3\")   Wt 71.7 kg (158 lb)   LMP 07/20/2019   BMI 27.99 kg/m²     Physical Exam:  Constitutional: Alert, no distress, well-groomed.  Skin: No rashes in visible areas.  Eye: Round. Conjunctiva clear, lids normal. No icterus.   ENMT: Lips pink without lesions, good dentition, moist mucous membranes. Phonation normal.  Neck: No masses, no thyromegaly. Moves freely without pain.  Respiratory: Unlabored respiratory effort, no cough or audible wheeze  Psych: Alert and oriented x3, normal affect and mood.       Assessment and Plan:   The following treatment plan was discussed:     1. Suspected UTI  - cefdinir (OMNICEF) 300 MG Cap; Take 1 Cap by mouth every 12 hours for 5 days.  Dispense: 10 Cap; Refill: 0  - URINE CULTURE(NEW); Future    2. Breast feeding status of mother    Discussed limitations of telemedicine today unfortunately unable to contact urinalysis- however with patient's new symptoms suspect UTI at this time.  We will place the patient on the above encourage patient to reach out to newborns pediatrician regarding Omnicef and breast-feeding status.  However patient is clearly instructed to provide urine sample at the [facility] urgent care for urine culture-I called and spoke with urgent care staff who was made aware of this patient and the plan.    Pt. Was given ABX therapy today and will change therapy if culture indicates this is necessary. ER precautions given- worsening symptoms, back pain, abd. Pain, or fevers.   Pt. Is to increase fluids, and take the complete duration of the therapy.   Pt. Understands and agrees with the plan.     Please note that this dictation was created using voice recognition software. I have made every reasonable attempt to correct obvious errors, but I expect that there are errors of grammar and possibly content " that I did not discover before finalizing the note.

## 2020-05-21 DIAGNOSIS — R39.89 SUSPECTED UTI: ICD-10-CM

## 2020-05-23 LAB
BACTERIA UR CULT: NORMAL
SIGNIFICANT IND 70042: NORMAL
SITE SITE: NORMAL
SOURCE SOURCE: NORMAL